# Patient Record
Sex: FEMALE | Race: WHITE
[De-identification: names, ages, dates, MRNs, and addresses within clinical notes are randomized per-mention and may not be internally consistent; named-entity substitution may affect disease eponyms.]

---

## 2022-06-14 ENCOUNTER — HOSPITAL ENCOUNTER (EMERGENCY)
Dept: HOSPITAL 26 - MED | Age: 82
End: 2022-06-14
Payer: COMMERCIAL

## 2022-06-14 VITALS — HEIGHT: 64 IN | WEIGHT: 240 LBS | BODY MASS INDEX: 40.97 KG/M2

## 2022-06-14 DIAGNOSIS — I46.9: Primary | ICD-10-CM

## 2022-06-14 DIAGNOSIS — J96.00: ICD-10-CM

## 2022-06-14 NOTE — NUR
NOTIFIED [facility] PRIOR TO RETURN CALL FROM 

-------------------------------------------------------------------------------

Addendum: 06/15/22 at 0002 by [email or web address]

-------------------------------------------------------------------------------

[facility] WILL NOT BE MOVING FORWARD. REF# [id number]

## 2022-06-14 NOTE — NUR
22.34 PM  - Patient BIB by ALS from [facility]. Patient arrived, full 
arrest, no pulse, Asystole. Started CPA and called code blue. (see code blue 
record)

## 2022-06-15 NOTE — NUR
ATTEMPTED TO CALL PT'S PCP, DR FERNANDO GREGORY AT [phone]. THE 
PHYSICIAN'S OFFICE DOES NOT OFFER VOICEMAIL SERVICE TO NOTIFY PCP OF PT'S 
CONDITION.

## 2022-06-15 NOTE — NUR
pt body moved to rm 128

-------------------------------------------------------------------------------

Addendum: 06/15/22 at 0646 by [email or web address]

-------------------------------------------------------------------------------

documentation in bed 13 chart